# Patient Record
Sex: MALE | Race: BLACK OR AFRICAN AMERICAN | NOT HISPANIC OR LATINO | Employment: FULL TIME | URBAN - METROPOLITAN AREA
[De-identification: names, ages, dates, MRNs, and addresses within clinical notes are randomized per-mention and may not be internally consistent; named-entity substitution may affect disease eponyms.]

---

## 2017-05-28 ENCOUNTER — APPOINTMENT (OUTPATIENT)
Dept: GENERAL RADIOLOGY | Facility: CLINIC | Age: 52
End: 2017-05-28
Attending: EMERGENCY MEDICINE
Payer: COMMERCIAL

## 2017-05-28 ENCOUNTER — HOSPITAL ENCOUNTER (EMERGENCY)
Facility: CLINIC | Age: 52
Discharge: HOME | End: 2017-05-28
Attending: EMERGENCY MEDICINE | Admitting: EMERGENCY MEDICINE
Payer: COMMERCIAL

## 2017-05-28 VITALS
OXYGEN SATURATION: 100 % | HEIGHT: 71 IN | DIASTOLIC BLOOD PRESSURE: 81 MMHG | SYSTOLIC BLOOD PRESSURE: 123 MMHG | WEIGHT: 150 LBS | BODY MASS INDEX: 21 KG/M2 | TEMPERATURE: 98.3 F

## 2017-05-28 DIAGNOSIS — R55 SYNCOPE AND COLLAPSE: ICD-10-CM

## 2017-05-28 DIAGNOSIS — R07.9 CHEST PAIN, UNSPECIFIED TYPE: ICD-10-CM

## 2017-05-28 LAB
ANION GAP SERPL CALCULATED.3IONS-SCNC: 6 MMOL/L (ref 3–14)
BASOPHILS # BLD AUTO: 0 10E9/L (ref 0–0.2)
BASOPHILS NFR BLD AUTO: 0.7 %
BUN SERPL-MCNC: 28 MG/DL (ref 7–30)
CALCIUM SERPL-MCNC: 9.1 MG/DL (ref 8.5–10.1)
CHLORIDE SERPL-SCNC: 102 MMOL/L (ref 94–109)
CO2 SERPL-SCNC: 30 MMOL/L (ref 20–32)
CREAT SERPL-MCNC: 0.92 MG/DL (ref 0.66–1.25)
D DIMER PPP FEU-MCNC: NORMAL UG/ML FEU (ref 0–0.5)
DIFFERENTIAL METHOD BLD: NORMAL
EOSINOPHIL # BLD AUTO: 0.1 10E9/L (ref 0–0.7)
EOSINOPHIL NFR BLD AUTO: 1.2 %
ERYTHROCYTE [DISTWIDTH] IN BLOOD BY AUTOMATED COUNT: 11.5 % (ref 10–15)
GFR SERPL CREATININE-BSD FRML MDRD: 86 ML/MIN/1.7M2
GLUCOSE SERPL-MCNC: 105 MG/DL (ref 70–99)
HCT VFR BLD AUTO: 43.1 % (ref 40–53)
HGB BLD-MCNC: 14.6 G/DL (ref 13.3–17.7)
IMM GRANULOCYTES # BLD: 0 10E9/L (ref 0–0.4)
IMM GRANULOCYTES NFR BLD: 0.5 %
INTERPRETATION ECG - MUSE: NORMAL
LYMPHOCYTES # BLD AUTO: 1 10E9/L (ref 0.8–5.3)
LYMPHOCYTES NFR BLD AUTO: 22.6 %
MCH RBC QN AUTO: 30.2 PG (ref 26.5–33)
MCHC RBC AUTO-ENTMCNC: 33.9 G/DL (ref 31.5–36.5)
MCV RBC AUTO: 89 FL (ref 78–100)
MONOCYTES # BLD AUTO: 0.3 10E9/L (ref 0–1.3)
MONOCYTES NFR BLD AUTO: 7.6 %
NEUTROPHILS # BLD AUTO: 2.9 10E9/L (ref 1.6–8.3)
NEUTROPHILS NFR BLD AUTO: 67.4 %
NRBC # BLD AUTO: 0 10*3/UL
NRBC BLD AUTO-RTO: 0 /100
PLATELET # BLD AUTO: 167 10E9/L (ref 150–450)
POTASSIUM SERPL-SCNC: 4.2 MMOL/L (ref 3.4–5.3)
RBC # BLD AUTO: 4.83 10E12/L (ref 4.4–5.9)
SODIUM SERPL-SCNC: 138 MMOL/L (ref 133–144)
TROPONIN I BLD-MCNC: 0 UG/L (ref 0–0.1)
TROPONIN I BLD-MCNC: 0 UG/L (ref 0–0.1)
WBC # BLD AUTO: 4.3 10E9/L (ref 4–11)

## 2017-05-28 PROCEDURE — 85379 FIBRIN DEGRADATION QUANT: CPT | Performed by: EMERGENCY MEDICINE

## 2017-05-28 PROCEDURE — 84484 ASSAY OF TROPONIN QUANT: CPT | Mod: 91

## 2017-05-28 PROCEDURE — 71020 XR CHEST 2 VW: CPT

## 2017-05-28 PROCEDURE — 99285 EMERGENCY DEPT VISIT HI MDM: CPT | Mod: 25

## 2017-05-28 PROCEDURE — 93005 ELECTROCARDIOGRAM TRACING: CPT

## 2017-05-28 PROCEDURE — 85025 COMPLETE CBC W/AUTO DIFF WBC: CPT | Performed by: EMERGENCY MEDICINE

## 2017-05-28 PROCEDURE — 80048 BASIC METABOLIC PNL TOTAL CA: CPT | Performed by: EMERGENCY MEDICINE

## 2017-05-28 ASSESSMENT — ENCOUNTER SYMPTOMS
PALPITATIONS: 1
ABDOMINAL PAIN: 1
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
## 2017-05-28 NOTE — LETTER
[facility] EMERGENCY DEPARTMENT  [address]  [facility] [address]  [phone]    May 28, 2017    Lee Copeland  [address]   [facility] [address]  [phone] (home) none (work)    : 1965      To Whom it may concern:    Lee Copeland was seen in our Emergency Department today, May 28, 2017.  I expect his condition to improve over the next 3 days.  He may return to work when improved.    Sincerely,        Iain Kwon
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
## 2017-05-28 NOTE — ED AVS SNAPSHOT
[facility] Emergency Department    [address]    [facility] [address]    Phone:  [phone]    Fax:  [phone]                                       Lee Copeland   MRN: [id number]    Department:  [facility] Emergency Department   Date of Visit:  5/28/2017           Patient Information     Date Of Birth          1965        Your diagnoses for this visit were:     Syncope and collapse     Chest pain, unspecified type        You were seen by Iain Kwon MD.      Follow-up Information     Follow up with Clinic, [facility] In 2 days.    Contact information:    [address] [facility] [address]  [phone]          Follow up with [facility] Emergency Department.    Specialty:  EMERGENCY MEDICINE    Why:  If symptoms worsen    Contact information:    [address]  [facility] [address]  [phone]        Discharge Instructions         Fainting: Uncertain Cause  Fainting (syncope) is a temporary loss of consciousness. It s also called passing out. It occurs when blood flow to the brain is less than normal. Near-fainting (near-syncope) is very similar to fainting, but you don t fully pass out.  Common minor causes of fainting include:    Sudden fear    Pain    Nausea    Emotional stress    Overexertion  Suddenly standing up after sitting or lying for a long time can also cause fainting.  More serious causes of fainting include:    Very slow or very fast heartbeat (arrhythmia)    Other types of heart disease    Dehydration    Loss of blood loss    Seizure    Stroke    Ruptured blood vessel in the brain  Taking too much high blood pressure medicine can also cause low blood pressure and fainting.  Your health care provider does not know the exact cause of your fainting. But the tests today did not show any of the serious causes of fainting. Sometimes you may need more tests to find out if you have a serious  problem. That s why it s important to follow up with your provider as advised.  Home care  Follow these guidelines when caring for yourself at home:    Rest today. You may go back to your normal activities when you are feeling back to normal. It is best to stay with someone who can check on you for the next 24 hours to watch for another episode of fainting.    If you become lightheaded or dizzy, lie down right away. Or sit with your head between your knees.    Because the provider doesn t know the exact cause of your fainting or near-fainting spell, it s possible for you to have another spell without warning. Because of this, don t drive a car or operate dangerous equipment. Don t take a bath alone. Use a shower instead. Don t swim alone until your health care provider says that you are no longer in danger of having another fainting spell.  Follow-up care  Follow up with your health care provider, or as advised.  When to seek medical advice  Call your health care provider right away if any of these occur:    Another fainting spell that s not explained by the common causes listed above    Pain in your chest, arm, neck, jaw, back, or abdomen    Shortness of breath    Severe headache or seizure    Blood in vomit or stools (black or red color)    Unexpected vaginal bleeding    Your heart beats very rapidly, very slowly, or irregularly (palpitations)  Also call your provider if you have signs of stroke:    Weakness in an arm or leg or on one side of the face    Difficulty speaking or seeing    Extreme drowsiness, confusion, dizziness, or fainting    [id number] [employer]. [address] [place] [address], [place] [address]. All rights reserved. This information is not intended as a substitute for professional medical care. Always follow your healthcare professional's instructions.          24 Hour Appointment Hotline       To make an appointment at any [facility], call [phone] ([phone]). If you  don't have a family doctor or clinic, we will help you find one. [facility] are conveniently located to serve the needs of you and your family.             Review of your medicines      Notice     You have not been prescribed any medications.            Procedures and tests performed during your visit     Procedure/Test Number of Times Performed    Basic metabolic panel (BMP) 1    CBC + differential 1    D dimer quantitative 1    EKG 12 lead 1    ISTAT troponin 2    IV access 1    Troponin POCT 1    XR Chest 2 Views 1      Orders Needing Specimen Collection     None      Pending Results     Date and Time Order Name Status Description    5/28/2017 1136 EKG 12 lead Preliminary             Pending Culture Results     No orders found from 5/26/2017 to 5/29/2017.            Pending Results Instructions     If you had any lab results that were not finalized at the time of your Discharge, you can call the ED Lab Result RN at [phone]. You will be contacted by this team for any positive Lab results or changes in treatment. The nurses are available 7 days a week from 10A to 6:30P.  You can leave a message 24 hours per day and they will return your call.        Test Results From Your Hospital Stay        5/28/2017 12:18 PM      Component Results     Component Value Ref Range & Units Status    WBC 4.3 4.0 - 11.0 10e9/L Final    RBC Count 4.83 4.4 - 5.9 10e12/L Final    Hemoglobin 14.6 13.3 - 17.7 g/dL Final    Hematocrit 43.1 40.0 - 53.0 % Final    MCV 89 78 - 100 fl Final    MCH 30.2 26.5 - 33.0 pg Final    MCHC 33.9 31.5 - 36.5 g/dL Final    RDW 11.5 10.0 - 15.0 % Final    Platelet Count 167 150 - 450 10e9/L Final    Diff Method Automated Method  Final    % Neutrophils 67.4 % Final    % Lymphocytes 22.6 % Final    % Monocytes 7.6 % Final    % Eosinophils 1.2 % Final    % Basophils 0.7 % Final    % Immature Granulocytes 0.5 % Final    Nucleated RBCs 0 0 /100 Final    Absolute Neutrophil 2.9 1.6 - 8.3 10e9/L Final     Absolute Lymphocytes 1.0 0.8 - 5.3 10e9/L Final    Absolute Monocytes 0.3 0.0 - 1.3 10e9/L Final    Absolute Eosinophils 0.1 0.0 - 0.7 10e9/L Final    Absolute Basophils 0.0 0.0 - 0.2 10e9/L Final    Abs Immature Granulocytes 0.0 0 - 0.4 10e9/L Final    Absolute Nucleated RBC 0.0  Final         5/28/2017 12:31 PM      Component Results     Component Value Ref Range & Units Status    Sodium 138 133 - 144 mmol/L Final    Potassium 4.2 3.4 - 5.3 mmol/L Final    Chloride 102 94 - 109 mmol/L Final    Carbon Dioxide 30 20 - 32 mmol/L Final    Anion Gap 6 3 - 14 mmol/L Final    Glucose 105 (H) 70 - 99 mg/dL Final    Urea Nitrogen 28 7 - 30 mg/dL Final    Creatinine 0.92 0.66 - 1.25 mg/dL Final    GFR Estimate 86 >60 mL/min/1.7m2 Final    Non  GFR Calc    GFR Estimate If Black >90   GFR Calc   >60 mL/min/1.7m2 Final    Calcium 9.1 8.5 - 10.1 mg/dL Final         5/28/2017 12:30 PM      Component Results     Component Value Ref Range & Units Status    D Dimer  0.0 - 0.50 ug/ml FEU Final    <0.3  This D-dimer assay is intended for use in conjuntion with a clinical pretest   probability assessment model to exclude pulmonary embolism (PE) and as an aid   in the diagnosis of deep venous thrombosis (DVT) in outpatients suspected of PE   or DVT. The cut-off value is 0.5 g/mL FEU.           5/28/2017 12:13 PM      Narrative     CHEST TWO VIEWS   5/28/2017 12:11 PM    HISTORY:  Chest pain and shortness of breath.    COMPARISON:  None.    FINDINGS:  The heart size is normal. No mediastinal pathology is seen.  The lungs are clear. The pulmonary vasculature is normal. No  pneumothorax or pleural effusion is seen.         Impression     IMPRESSION:  Unremarkable chest.    YUDELKA BARRIENTOS MD         5/28/2017 12:16 PM      Component Results     Component Value Ref Range & Units Status    Troponin I 0.00 0.00 - 0.10 ug/L Final                Clinical Quality Measure: Blood Pressure Screening     Your  "blood pressure was checked while you were in the emergency department today. The last reading we obtained was  BP: 115/79 . Please read the guidelines below about what these numbers mean and what you should do about them.  If your systolic blood pressure (the top number) is less than 120 and your diastolic blood pressure (the bottom number) is less than 80, then your blood pressure is normal. There is nothing more that you need to do about it.  If your systolic blood pressure (the top number) is 120-139 or your diastolic blood pressure (the bottom number) is 80-89, your blood pressure may be higher than it should be. You should have your blood pressure rechecked within a year by a primary care provider.  If your systolic blood pressure (the top number) is 140 or greater or your diastolic blood pressure (the bottom number) is 90 or greater, you may have high blood pressure. High blood pressure is treatable, but if left untreated over time it can put you at risk for heart attack, stroke, or kidney failure. You should have your blood pressure rechecked by a primary care provider within the next 4 weeks.  If your provider in the emergency department today gave you specific instructions to follow-up with your doctor or provider even sooner than that, you should follow that instruction and not wait for up to 4 weeks for your follow-up visit.        Thank you for choosing [facility]       Thank you for choosing [facility] for your care. Our goal is always to provide you with excellent care. Hearing back from our patients is one way we can continue to improve our services. Please take a few minutes to complete the written survey that you may receive in the mail after you visit with us. Thank you!        [employer] Information     [employer] lets you send messages to your doctor, view your test results, renew your prescriptions, schedule appointments and more. To sign up, go to [email or web address] . Click on \"Log in\" on the left " "side of the screen, which will take you to the Welcome page. Then click on \"Sign up Now\" on the right side of the page.     You will be asked to enter the access code listed below, as well as some personal information. Please follow the directions to create your username and password.     Your access code is: [email or web address]  Expires: 2017  3:28 PM     Your access code will  in 90 days. If you need help or a new code, please call your [facility] clinic or [phone].        Care EveryWhere ID     This is your Care EveryWhere ID. This could be used by other organizations to access your [facility] medical records  [id number]        After Visit Summary       This is your record. Keep this with you and show to your community pharmacist(s) and doctor(s) at your next visit.                  "

## 2017-05-28 NOTE — ED AVS SNAPSHOT
[facility] Emergency Department    [address]    [facility] [address]    Phone:  [phone]    Fax:  [phone]                                       Lee Copeland   MRN: [id number]    Department:  [facility] Emergency Department   Date of Visit:  5/28/2017           After Visit Summary Signature Page     I have received my discharge instructions, and my questions have been answered. I have discussed any challenges I see with this plan with the nurse or doctor.    ..........................................................................................................................................  Patient/Patient Representative Signature      ..........................................................................................................................................  Patient Representative Print Name and Relationship to Patient    ..................................................               ................................................  Date                                            Time    ..........................................................................................................................................  Reviewed by Signature/Title    ...................................................              ..............................................  Date                                                            Time

## 2017-05-28 NOTE — DISCHARGE INSTRUCTIONS
Fainting: Uncertain Cause  Fainting (syncope) is a temporary loss of consciousness. It s also called passing out. It occurs when blood flow to the brain is less than normal. Near-fainting (near-syncope) is very similar to fainting, but you don t fully pass out.  Common minor causes of fainting include:    Sudden fear    Pain    Nausea    Emotional stress    Overexertion  Suddenly standing up after sitting or lying for a long time can also cause fainting.  More serious causes of fainting include:    Very slow or very fast heartbeat (arrhythmia)    Other types of heart disease    Dehydration    Loss of blood loss    Seizure    Stroke    Ruptured blood vessel in the brain  Taking too much high blood pressure medicine can also cause low blood pressure and fainting.  Your health care provider does not know the exact cause of your fainting. But the tests today did not show any of the serious causes of fainting. Sometimes you may need more tests to find out if you have a serious problem. That s why it s important to follow up with your provider as advised.  Home care  Follow these guidelines when caring for yourself at home:    Rest today. You may go back to your normal activities when you are feeling back to normal. It is best to stay with someone who can check on you for the next 24 hours to watch for another episode of fainting.    If you become lightheaded or dizzy, lie down right away. Or sit with your head between your knees.    Because the provider doesn t know the exact cause of your fainting or near-fainting spell, it s possible for you to have another spell without warning. Because of this, don t drive a car or operate dangerous equipment. Don t take a bath alone. Use a shower instead. Don t swim alone until your health care provider says that you are no longer in danger of having another fainting spell.  Follow-up care  Follow up with your health care provider, or as advised.  When to seek medical advice  Call  your health care provider right away if any of these occur:    Another fainting spell that s not explained by the common causes listed above    Pain in your chest, arm, neck, jaw, back, or abdomen    Shortness of breath    Severe headache or seizure    Blood in vomit or stools (black or red color)    Unexpected vaginal bleeding    Your heart beats very rapidly, very slowly, or irregularly (palpitations)  Also call your provider if you have signs of stroke:    Weakness in an arm or leg or on one side of the face    Difficulty speaking or seeing    Extreme drowsiness, confusion, dizziness, or fainting    [id number] [employer]. [address] [place] [address]. All rights reserved. This information is not intended as a substitute for professional medical care. Always follow your healthcare professional's instructions.

## 2022-03-03 ENCOUNTER — APPOINTMENT (OUTPATIENT)
Dept: GENERAL RADIOLOGY | Facility: CLINIC | Age: 57
End: 2022-03-03
Attending: EMERGENCY MEDICINE
Payer: COMMERCIAL

## 2022-03-03 ENCOUNTER — HOSPITAL ENCOUNTER (EMERGENCY)
Facility: CLINIC | Age: 57
Discharge: HOME | End: 2022-03-03
Attending: EMERGENCY MEDICINE | Admitting: EMERGENCY MEDICINE
Payer: COMMERCIAL

## 2022-03-03 VITALS
OXYGEN SATURATION: 100 % | HEART RATE: 62 BPM | TEMPERATURE: 97.2 F | SYSTOLIC BLOOD PRESSURE: 134 MMHG | RESPIRATION RATE: 11 BRPM | DIASTOLIC BLOOD PRESSURE: 80 MMHG

## 2022-03-03 DIAGNOSIS — R07.9 CHEST PAIN, UNSPECIFIED TYPE: ICD-10-CM

## 2022-03-03 LAB
ANION GAP SERPL CALCULATED.3IONS-SCNC: 2 MMOL/L (ref 3–14)
BASOPHILS # BLD AUTO: 0 10E3/UL (ref 0–0.2)
BASOPHILS NFR BLD AUTO: 1 %
BUN SERPL-MCNC: 12 MG/DL (ref 7–30)
CALCIUM SERPL-MCNC: 8.5 MG/DL (ref 8.5–10.1)
CHLORIDE BLD-SCNC: 105 MMOL/L (ref 94–109)
CO2 SERPL-SCNC: 32 MMOL/L (ref 20–32)
CREAT SERPL-MCNC: 0.84 MG/DL (ref 0.66–1.25)
EOSINOPHIL # BLD AUTO: 0.1 10E3/UL (ref 0–0.7)
EOSINOPHIL NFR BLD AUTO: 4 %
ERYTHROCYTE [DISTWIDTH] IN BLOOD BY AUTOMATED COUNT: 11.8 % (ref 10–15)
GFR SERPL CREATININE-BSD FRML MDRD: >90 ML/MIN/1.73M2
GLUCOSE BLD-MCNC: 87 MG/DL (ref 70–99)
HCT VFR BLD AUTO: 42 % (ref 40–53)
HGB BLD-MCNC: 14.2 G/DL (ref 13.3–17.7)
HOLD SPECIMEN: NORMAL
IMM GRANULOCYTES # BLD: 0 10E3/UL
IMM GRANULOCYTES NFR BLD: 0 %
LYMPHOCYTES # BLD AUTO: 1.4 10E3/UL (ref 0.8–5.3)
LYMPHOCYTES NFR BLD AUTO: 41 %
MCH RBC QN AUTO: 30.5 PG (ref 26.5–33)
MCHC RBC AUTO-ENTMCNC: 33.8 G/DL (ref 31.5–36.5)
MCV RBC AUTO: 90 FL (ref 78–100)
MONOCYTES # BLD AUTO: 0.4 10E3/UL (ref 0–1.3)
MONOCYTES NFR BLD AUTO: 12 %
NEUTROPHILS # BLD AUTO: 1.4 10E3/UL (ref 1.6–8.3)
NEUTROPHILS NFR BLD AUTO: 42 %
NRBC # BLD AUTO: 0 10E3/UL
NRBC BLD AUTO-RTO: 0 /100
PLATELET # BLD AUTO: 160 10E3/UL (ref 150–450)
POTASSIUM BLD-SCNC: 3.9 MMOL/L (ref 3.4–5.3)
RBC # BLD AUTO: 4.65 10E6/UL (ref 4.4–5.9)
SODIUM SERPL-SCNC: 139 MMOL/L (ref 133–144)
TROPONIN I SERPL HS-MCNC: 7 NG/L
TROPONIN I SERPL HS-MCNC: 9 NG/L
WBC # BLD AUTO: 3.3 10E3/UL (ref 4–11)

## 2022-03-03 PROCEDURE — 84484 ASSAY OF TROPONIN QUANT: CPT | Performed by: EMERGENCY MEDICINE

## 2022-03-03 PROCEDURE — 71046 X-RAY EXAM CHEST 2 VIEWS: CPT

## 2022-03-03 PROCEDURE — 82310 ASSAY OF CALCIUM: CPT | Performed by: EMERGENCY MEDICINE

## 2022-03-03 PROCEDURE — 99285 EMERGENCY DEPT VISIT HI MDM: CPT | Mod: 25

## 2022-03-03 PROCEDURE — 250N000013 HC RX MED GY IP 250 OP 250 PS 637: Performed by: EMERGENCY MEDICINE

## 2022-03-03 PROCEDURE — 36415 COLL VENOUS BLD VENIPUNCTURE: CPT | Performed by: EMERGENCY MEDICINE

## 2022-03-03 PROCEDURE — 93005 ELECTROCARDIOGRAM TRACING: CPT

## 2022-03-03 PROCEDURE — 85025 COMPLETE CBC W/AUTO DIFF WBC: CPT | Performed by: EMERGENCY MEDICINE

## 2022-03-03 RX ORDER — ASPIRIN 325 MG
325 TABLET ORAL ONCE
Status: COMPLETED | OUTPATIENT
Start: 2022-03-03 | End: 2022-03-03

## 2022-03-03 RX ADMIN — ASPIRIN 325 MG ORAL TABLET 325 MG: 325 PILL ORAL at 17:23

## 2022-03-03 ASSESSMENT — ENCOUNTER SYMPTOMS
NAUSEA: 0
FEVER: 0
VOMITING: 0
SHORTNESS OF BREATH: 0
COUGH: 0

## 2022-03-03 NOTE — ED PROVIDER NOTES
History   Chief Complaint:  Chest Pain      HPI   Lee Copeland is a 57 year old male with history of hyperlipidemia who presents for evaluation of chest pain. This morning around 0530 shortly after pushing a heavy couch the patient started to develop pain and pressure in his chest that has been present throughout the day, prompting him to come into the ED for evaluation. He cannot identify any exacerbating or alleviating factors for his pain, and it is not worse with deep breathing. He notes that he tends to get lots of exercise and does physical work. He denies any fever, cough, shortness of breath, leg swelling, nausea, or vomiting. He notes that he did have COVID-19 last year and he is vaccinated for COVID-19.     Review of Systems   Constitutional: Negative for fever.   Respiratory: Negative for cough and shortness of breath.    Cardiovascular: Positive for chest pain. Negative for leg swelling.   Gastrointestinal: Negative for nausea and vomiting.   All other systems reviewed and are negative.    Allergies:  No known drug allergies     Medications:  Omeprazole     Past Medical History:     GERD   Asthma  Hyperlipidemia     Family History:    Diabetes mellitus, type I - Daughter     Social History:  The patient presents to the ED accompanied by his daughter.   The patient is vaccinated for COVID-19.     Physical Exam     Patient Vitals for the past 24 hrs:   BP Temp Temp src Pulse Resp SpO2   03/03/22 1745 -- -- -- 58 20 100 %   03/03/22 1730 -- -- -- 61 10 100 %   03/03/22 1630 122/82 -- -- 62 19 100 %   03/03/22 1615 -- -- -- 62 14 --   03/03/22 1558 113/81 97.2  F (36.2  C) Temporal 67 18 100 %       Physical Exam  General: Patient is awake, alert and interactive when I enter the room  Head: The scalp, face, and head appear normal  Eyes: The pupils are equal, round, and reactive to light. Conjunctivae and sclerae are normal  Neck: Normal range of motion.  CV: Regular rate and rhythm. Peripheral pulses  including radial pulses are symmetric.   Resp: Lungs are clear without wheezes or rales. No respiratory distress.   GI: Abdomen is soft, no rigidity, guarding, or rebound. No distension. No tenderness to palpation in any quadrant.     MS: Chest wall is non tender to palpation. No asymmetric leg swelling, calf or thigh tenderness.    Skin: No rash or lesions noted. Normal capillary refill noted  Neuro: Speech is normal and fluent. Face is symmetric. Moving all extremities.   Psych:  Normal affect.  Appropriate interactions.     Emergency Department Course   ECG  ECG obtained at 16:06:50, ECG read at 1608  Normal sinus rhythm, Nonspecific T wave abnormality, Abnormal ECG   No significant change as compared to EKG dated 5/28/2017.   Rate 65 bpm. DC interval 170 ms. QRS duration 98 ms. QT/QTc 368/382 ms. P-R-T axes 71 12 73.     Imaging:  XR Chest 2 Views   Final Result   IMPRESSION: Negative chest.        Report per radiology    Laboratory:  Labs Ordered and Resulted from Time of ED Arrival to Time of ED Departure   BASIC METABOLIC PANEL - Abnormal       Result Value    Sodium 139      Potassium 3.9      Chloride 105      Carbon Dioxide (CO2) 32      Anion Gap 2 (*)     Urea Nitrogen 12      Creatinine 0.84      Calcium 8.5      Glucose 87      GFR Estimate >90     CBC WITH PLATELETS AND DIFFERENTIAL - Abnormal    WBC Count 3.3 (*)     RBC Count 4.65      Hemoglobin 14.2      Hematocrit 42.0      MCV 90      MCH 30.5      MCHC 33.8      RDW 11.8      Platelet Count 160      % Neutrophils 42      % Lymphocytes 41      % Monocytes 12      % Eosinophils 4      % Basophils 1      % Immature Granulocytes 0      NRBCs per 100 WBC 0      Absolute Neutrophils 1.4 (*)     Absolute Lymphocytes 1.4      Absolute Monocytes 0.4      Absolute Eosinophils 0.1      Absolute Basophils 0.0      Absolute Immature Granulocytes 0.0      Absolute NRBCs 0.0     TROPONIN I - Normal    Troponin I High Sensitivity 9     TROPONIN I - Normal     Troponin I High Sensitivity 7          Emergency Department Course:     Reviewed:  I reviewed nursing notes, vitals and past medical history    Assessments:  1605:  I obtained history and examined the patient as noted above.     1757: I updated and reassessed the patient.     Disposition:  The patient was discharged to home.     Impression & Plan     Medical Decision Making:  Patient is a 57-year-old gentleman with past medical history of hyperlipidemia who presents to the emergency department with left-sided chest pain that began around 530 this morning.  Patient reports that he was moving a heavy couch and began having pain shortly thereafter.  Pain has been constant since onset.  No clear exacerbating or alleviating symptoms.  Upon initial evaluation here he is hemodynamically stable with normal vital signs.  He is afebrile.  He is oxygenating well on room air.  He does not appear in acute distress.  Initial EKG does not show any acute signs of ischemia nor dysrhythmia.  Troponin x2 were within low level did not show significant rise.  I feel that ACS is much less likely given his work-up and presentation.  It be reasonable to have him follow-up with his primary care physician to discuss stress testing.  No additional sinister cause was elicited based on the patient's work-up.  Chest x-ray did not show any acute signs of pneumonia, pneumothorax, wide mediastinum, pleural effusion or pulmonary edema.  I feel that PE is less likely based on the patient's history and presentation.  Low risk by Wells criteria.  At this point I feel comfortable discharging the patient home and he can follow-up closely with his primary care physician.  If he has any new or worsening symptoms I instructed him to return the emergency department for further evaluation.    Diagnosis:    ICD-10-CM    1. Chest pain, unspecified type  R07.9        Scribe Disclosure:  PRITI, Francisco Wisdom, am serving as a scribe at 4:05 PM on 3/3/2022 to document  services personally performed by Scott Cullen MD, based on my observations and the provider's statements to me.         Scott Cullen MD  03/03/22 2090

## 2022-03-03 NOTE — ED TRIAGE NOTES
Pt arrives with c/o chest pressure that started after pushing a heavy couch this morning. Pt reports the pressure has lessened, but it is still there. Pt denies nausea and SOB. ABCs intact.

## 2022-03-04 LAB
ATRIAL RATE - MUSE: 65 BPM
DIASTOLIC BLOOD PRESSURE - MUSE: NORMAL MMHG
INTERPRETATION ECG - MUSE: NORMAL
P AXIS - MUSE: 71 DEGREES
PR INTERVAL - MUSE: 170 MS
QRS DURATION - MUSE: 98 MS
QT - MUSE: 368 MS
QTC - MUSE: 382 MS
R AXIS - MUSE: 12 DEGREES
SYSTOLIC BLOOD PRESSURE - MUSE: NORMAL MMHG
T AXIS - MUSE: 73 DEGREES
VENTRICULAR RATE- MUSE: 65 BPM

## 2022-08-10 ENCOUNTER — HOSPITAL ENCOUNTER (EMERGENCY)
Facility: CLINIC | Age: 57
Discharge: HOME | End: 2022-08-10
Attending: EMERGENCY MEDICINE | Admitting: EMERGENCY MEDICINE

## 2022-08-10 ENCOUNTER — APPOINTMENT (OUTPATIENT)
Dept: GENERAL RADIOLOGY | Facility: CLINIC | Age: 57
End: 2022-08-10
Attending: EMERGENCY MEDICINE

## 2022-08-10 ENCOUNTER — APPOINTMENT (OUTPATIENT)
Dept: CT IMAGING | Facility: CLINIC | Age: 57
End: 2022-08-10
Attending: EMERGENCY MEDICINE

## 2022-08-10 VITALS
DIASTOLIC BLOOD PRESSURE: 77 MMHG | OXYGEN SATURATION: 100 % | SYSTOLIC BLOOD PRESSURE: 114 MMHG | TEMPERATURE: 97.8 F | RESPIRATION RATE: 16 BRPM | HEART RATE: 62 BPM

## 2022-08-10 DIAGNOSIS — T14.8XXA NEURAPRAXIA: ICD-10-CM

## 2022-08-10 DIAGNOSIS — G44.319 ACUTE POST-TRAUMATIC HEADACHE, NOT INTRACTABLE: ICD-10-CM

## 2022-08-10 DIAGNOSIS — M54.50 ACUTE BILATERAL LOW BACK PAIN WITHOUT SCIATICA: ICD-10-CM
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
## 2022-08-10 DIAGNOSIS — M54.2 NECK PAIN: ICD-10-CM

## 2022-08-10 PROCEDURE — 72100 X-RAY EXAM L-S SPINE 2/3 VWS: CPT

## 2022-08-10 PROCEDURE — 99285 EMERGENCY DEPT VISIT HI MDM: CPT | Mod: 25

## 2022-08-10 PROCEDURE — 72040 X-RAY EXAM NECK SPINE 2-3 VW: CPT

## 2022-08-10 PROCEDURE — 70450 CT HEAD/BRAIN W/O DYE: CPT

## 2022-08-10 RX ORDER — CYCLOBENZAPRINE HCL 10 MG
10 TABLET ORAL 3 TIMES DAILY PRN
Qty: 15 TABLET | Refills: 0 | Status: SHIPPED | OUTPATIENT
Start: 2022-08-10

## 2022-08-10 RX ORDER — IBUPROFEN 600 MG/1
600 TABLET, FILM COATED ORAL EVERY 6 HOURS PRN
Qty: 30 TABLET | Refills: 0 | Status: SHIPPED | OUTPATIENT
Start: 2022-08-10

## 2022-08-10 ASSESSMENT — ACTIVITIES OF DAILY LIVING (ADL): ADLS_ACUITY_SCORE: 35

## 2022-08-10 ASSESSMENT — ENCOUNTER SYMPTOMS
SHORTNESS OF BREATH: 0
HEADACHES: 1
SLEEP DISTURBANCE: 1
NUMBNESS: 1
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
## 2023-04-16 ENCOUNTER — HEALTH MAINTENANCE LETTER (OUTPATIENT)
Age: 58
End: 2023-04-16

## 2023-12-05 ENCOUNTER — APPOINTMENT (OUTPATIENT)
Dept: MRI IMAGING | Facility: CLINIC | Age: 58
End: 2023-12-05
Attending: EMERGENCY MEDICINE
Payer: COMMERCIAL

## 2023-12-05 ENCOUNTER — HOSPITAL ENCOUNTER (EMERGENCY)
Facility: CLINIC | Age: 58
Discharge: HOME | End: 2023-12-05
Attending: EMERGENCY MEDICINE | Admitting: EMERGENCY MEDICINE
Payer: COMMERCIAL

## 2023-12-05 VITALS
HEART RATE: 66 BPM | RESPIRATION RATE: 22 BRPM | TEMPERATURE: 97.8 F | OXYGEN SATURATION: 100 % | DIASTOLIC BLOOD PRESSURE: 93 MMHG | SYSTOLIC BLOOD PRESSURE: 117 MMHG

## 2023-12-05 DIAGNOSIS — E23.6 EMPTY SELLA TURCICA (H): ICD-10-CM

## 2023-12-05 DIAGNOSIS — R42 DIZZINESS: ICD-10-CM

## 2023-12-05 DIAGNOSIS — H61.23 BILATERAL IMPACTED CERUMEN: ICD-10-CM

## 2023-12-05 LAB
ALBUMIN UR-MCNC: NEGATIVE MG/DL
ANION GAP SERPL CALCULATED.3IONS-SCNC: 8 MMOL/L (ref 7–15)
APPEARANCE UR: CLEAR
ATRIAL RATE - MUSE: 74 BPM
BASOPHILS # BLD AUTO: 0 10E3/UL (ref 0–0.2)
BASOPHILS NFR BLD AUTO: 1 %
BILIRUB UR QL STRIP: NEGATIVE
BUN SERPL-MCNC: 15.6 MG/DL (ref 8–23)
CALCIUM SERPL-MCNC: 8.7 MG/DL (ref 8.6–10)
CHLORIDE SERPL-SCNC: 106 MMOL/L (ref 98–107)
COLOR UR AUTO: YELLOW
CREAT SERPL-MCNC: 0.72 MG/DL (ref 0.67–1.17)
DEPRECATED HCO3 PLAS-SCNC: 26 MMOL/L (ref 22–29)
DIASTOLIC BLOOD PRESSURE - MUSE: NORMAL MMHG
EGFRCR SERPLBLD CKD-EPI 2021: >90 ML/MIN/1.73M2
EOSINOPHIL # BLD AUTO: 0.1 10E3/UL (ref 0–0.7)
EOSINOPHIL NFR BLD AUTO: 4 %
ERYTHROCYTE [DISTWIDTH] IN BLOOD BY AUTOMATED COUNT: 11.9 % (ref 10–15)
GLUCOSE SERPL-MCNC: 105 MG/DL (ref 70–99)
GLUCOSE UR STRIP-MCNC: NEGATIVE MG/DL
HCT VFR BLD AUTO: 42.7 % (ref 40–53)
HGB BLD-MCNC: 14.3 G/DL (ref 13.3–17.7)
HGB UR QL STRIP: NEGATIVE
IMM GRANULOCYTES # BLD: 0 10E3/UL
IMM GRANULOCYTES NFR BLD: 0 %
INTERPRETATION ECG - MUSE: NORMAL
KETONES UR STRIP-MCNC: NEGATIVE MG/DL
LEUKOCYTE ESTERASE UR QL STRIP: NEGATIVE
LYMPHOCYTES # BLD AUTO: 1.4 10E3/UL (ref 0.8–5.3)
LYMPHOCYTES NFR BLD AUTO: 42 %
MCH RBC QN AUTO: 30.2 PG (ref 26.5–33)
MCHC RBC AUTO-ENTMCNC: 33.5 G/DL (ref 31.5–36.5)
MCV RBC AUTO: 90 FL (ref 78–100)
MONOCYTES # BLD AUTO: 0.4 10E3/UL (ref 0–1.3)
MONOCYTES NFR BLD AUTO: 12 %
MUCOUS THREADS #/AREA URNS LPF: PRESENT /LPF
NEUTROPHILS # BLD AUTO: 1.3 10E3/UL (ref 1.6–8.3)
NEUTROPHILS NFR BLD AUTO: 41 %
NITRATE UR QL: NEGATIVE
NRBC # BLD AUTO: 0 10E3/UL
NRBC BLD AUTO-RTO: 0 /100
P AXIS - MUSE: 79 DEGREES
PH UR STRIP: 6.5 [PH] (ref 5–7)
PLATELET # BLD AUTO: 175 10E3/UL (ref 150–450)
POTASSIUM SERPL-SCNC: 4.3 MMOL/L (ref 3.4–5.3)
PR INTERVAL - MUSE: 166 MS
QRS DURATION - MUSE: 138 MS
QT - MUSE: 396 MS
QTC - MUSE: 439 MS
R AXIS - MUSE: -64 DEGREES
RBC # BLD AUTO: 4.73 10E6/UL (ref 4.4–5.9)
RBC URINE: 1 /HPF
SODIUM SERPL-SCNC: 140 MMOL/L (ref 135–145)
SP GR UR STRIP: 1.03 (ref 1–1.03)
SYSTOLIC BLOOD PRESSURE - MUSE: NORMAL MMHG
T AXIS - MUSE: 52 DEGREES
TROPONIN T SERPL HS-MCNC: <6 NG/L
UROBILINOGEN UR STRIP-MCNC: NORMAL MG/DL
VENTRICULAR RATE- MUSE: 74 BPM
WBC # BLD AUTO: 3.3 10E3/UL (ref 4–11)
WBC URINE: 1 /HPF

## 2023-12-05 PROCEDURE — A9585 GADOBUTROL INJECTION: HCPCS | Mod: JZ | Performed by: EMERGENCY MEDICINE

## 2023-12-05 PROCEDURE — 70544 MR ANGIOGRAPHY HEAD W/O DYE: CPT

## 2023-12-05 PROCEDURE — 84484 ASSAY OF TROPONIN QUANT: CPT | Performed by: STUDENT IN AN ORGANIZED HEALTH CARE EDUCATION/TRAINING PROGRAM

## 2023-12-05 PROCEDURE — 81001 URINALYSIS AUTO W/SCOPE: CPT | Performed by: EMERGENCY MEDICINE

## 2023-12-05 PROCEDURE — 85025 COMPLETE CBC W/AUTO DIFF WBC: CPT | Performed by: EMERGENCY MEDICINE

## 2023-12-05 PROCEDURE — 250N000013 HC RX MED GY IP 250 OP 250 PS 637: Performed by: STUDENT IN AN ORGANIZED HEALTH CARE EDUCATION/TRAINING PROGRAM

## 2023-12-05 PROCEDURE — 36415 COLL VENOUS BLD VENIPUNCTURE: CPT | Performed by: EMERGENCY MEDICINE

## 2023-12-05 PROCEDURE — 69209 REMOVE IMPACTED EAR WAX UNI: CPT | Mod: LT,RT

## 2023-12-05 PROCEDURE — 70553 MRI BRAIN STEM W/O & W/DYE: CPT

## 2023-12-05 PROCEDURE — 80048 BASIC METABOLIC PNL TOTAL CA: CPT | Performed by: EMERGENCY MEDICINE

## 2023-12-05 PROCEDURE — 70549 MR ANGIOGRAPH NECK W/O&W/DYE: CPT

## 2023-12-05 PROCEDURE — 93005 ELECTROCARDIOGRAM TRACING: CPT

## 2023-12-05 PROCEDURE — 99285 EMERGENCY DEPT VISIT HI MDM: CPT | Mod: 25

## 2023-12-05 PROCEDURE — 255N000002 HC RX 255 OP 636: Mod: JZ | Performed by: EMERGENCY MEDICINE

## 2023-12-05 RX ORDER — MECLIZINE HYDROCHLORIDE 25 MG/1
25 TABLET ORAL ONCE
Status: COMPLETED | OUTPATIENT
Start: 2023-12-05 | End: 2023-12-05

## 2023-12-05 RX ORDER — GADOBUTROL 604.72 MG/ML
10 INJECTION INTRAVENOUS ONCE
Status: COMPLETED | OUTPATIENT
Start: 2023-12-05 | End: 2023-12-05

## 2023-12-05 RX ORDER — MECLIZINE HYDROCHLORIDE 25 MG/1
25 TABLET ORAL 3 TIMES DAILY PRN
Qty: 25 TABLET | Refills: 0 | Status: SHIPPED | OUTPATIENT
Start: 2023-12-05

## 2023-12-05 RX ADMIN — MECLIZINE HYDROCHLORIDE 25 MG: 25 TABLET ORAL at 10:47

## 2023-12-05 RX ADMIN — GADOBUTROL 10 ML: 604.72 INJECTION INTRAVENOUS at 13:02

## 2023-12-05 ASSESSMENT — ACTIVITIES OF DAILY LIVING (ADL)
ADLS_ACUITY_SCORE: 35
ADLS_ACUITY_SCORE: 35

## 2023-12-05 NOTE — ED PROVIDER NOTES
ED ATTENDING PHYSICIAN NOTE:   I evaluated this patient in conjunction with May Cohen PA-C  I have participated in the care of the patient and personally performed key elements of the history, exam, and medical decision making.      HPI:   Lee Copeland is a 58 year old male who presents to the ED for evaluation of dizziness. The patient reports intermittent dizziness when he wakes in the morning.  Goes away after 10-15 minutes.  Been ongoing for a week about.        Independent Historian:   None - Patient Only    Review of External Notes:   Rviewed PCP note from 1/17/23 regarding ongoing issues and zyrtec.        EXAM:   General: Resting on the bed.  Head: No obvious trauma to head.  Ears, Nose, Throat:  External ears normal.  Nose normal.  No pharyngeal erythema, swelling or exudate.  Midline uvula.  Ear canal with wax obstructing   Eyes:  Conjunctivae clear.  Pupils are equal, round, and reactive.   Neck: Normal range of motion.  Neck supple.   CV: Regular rate and rhythm.  No murmurs.      Respiratory: Effort normal and breath sounds normal.  No wheezing or crackles.   Gastrointestinal: Soft.  No distension. There is no tenderness.     Neuro: Alert. Moving all extremities appropriately.  Normal speech.  CN II-XII grossly intact, no pronator drift, normal finger-nose-finger, visual fields intact.  Gross muscle strength intact of the proximal and distal bilateral upper and lower extremities.  Sensation intact to light touch in all 4 extremities.  2+ patellar reflexes.  Normal gait.  Negative rombergs  Skin: Skin is warm and dry.  No rash noted.     Independent Interpretation (X-rays, CTs, rhythm strip):  None    Consultations/Discussion of Management or Tests:  None     Social Determinants of Health affecting care:   None     MEDICAL DECISION MAKING/ASSESSMENT AND PLAN:   Lee Copeland is a 58 year old male who presents to the ER with dizziness.  Vital signs are reassuring.  Broad differential pursued  including but limited to stroke, tumor, dissection, mass, peripheral vertigo, otitis media, orthostasis, ACS, arrhythmia, anemia, etc.  EKG shows sinus rhythm, no acute ischemic changes.  Troponin is undetectable.  Low suspicion for ACS or arrhythmia.  CBC with chronic leukopenia but no anemia.  BMP without acute electrolyte, metabolic or renal dysfunction.  UA unrevealing for infection.  Given vague symptoms MRI, MRA were obtained.  Fortunately no signs of acute ischemia, hemorrhage etc.  There are some findings on MRI that were disclosed with patient the need outpatient neurology follow-up including the incidental infundibula as well as partially empty sella.  Patient's clinical history does not seem consistent with idiopathic intracranial hypertension.  Regardless outpatient neurology follow-up would be appropriate.  Patient is asymptomatic at this time after meclizine.  We have recommended outpatient neurology follow-up.  Patient feels well enough for discharge home.     DIAGNOSIS:     ICD-10-CM    1. Dizziness  R42       2. Bilateral impacted cerumen  H61.23       3. Empty sella turcica (H24)  E23.6     Partial           DISPOSITION:   discahrge     Scribe Disclosure:  I, Jeramie Layton, am serving as a scribe at 10:28 AM on 12/5/2023 to document services personally performed by Delilah Jauregui MD based on my observations and the provider's statements to me.     12/5/2023  [facility] EMERGENCY DEPT     Delilah Jauregui MD  12/05/23 4499

## 2023-12-05 NOTE — ED PROVIDER NOTES
History     Chief Complaint:  Dizziness     History limited by: patient is a poor historian.      Lee Copeland is a 58 year old male who presents to the ED for evaluation of dizziness. The patient reports he has felt dizzy in the morning for the last four days, and improves after roughly 15 minutes. Today he feels more dizzy than normal. He describes his dizziness as room-spinning present with his eyes close and worse with change of position. He states he has mild ear pressure but denies ear pain or muffled hearing. He notes one prior episode of dizziness several years ago, improved without intervention. The patient denies nausea, vomiting, gait changes, shortness of breath, chest pain, cough, congestion, fevers, chills, hearing changes, tinnitus, difficulty swallowing or talking, or medication changes.  Reports some mild neck pain although this is chronic.  He does endorse caffeine use but denies alcohol use, tobacco, or IV drug use.    Independent Historian:   None - Patient Only    Review of External Notes:   His annual physical exam from January of this year.  History of hyperlipidemia, prediabetes, GERD, allergic rhinitis    Medications:  Omeprazole     Past Medical History:     GERD  Asthma   Pre-diabetes  Hyperlipidemia    Leukopenia     Past Surgical History:    No past surgical history    Physical Exam   Patient Vitals for the past 24 hrs:   BP Temp Pulse Resp SpO2   12/05/23 1200 -- -- 67 22 --   12/05/23 1044 121/73 -- 64 21 100 %   12/05/23 1030 (!) 132/92 -- 66 16 100 %   12/05/23 1015 133/84 -- -- 20 100 %   12/05/23 0744 128/81 97.8  F (36.6  C) 77 18 100 %      Physical Exam  Vital signs and nursing notes reviewed.    General:  Alert and oriented, no acute distress. Resting on bed.   Skin: Skin is warm and dry. No rashes, lesions, or erythema. No diaphoresis.  HEENT:   Head: Normocephalic, atraumatic. Facial features symmetric.   Eyes: Conjunctiva pink, sclera white. EOMs grossly intact.   Ears:  Auricles without lesion, erythema, or edema.  Copious amounts of cerumen in bilateral ear canals.  After irrigation, TMs are pearly grey without erythema or bulging or perforation.  Nose: Symmetric with no discharge.  Mouth and throat: Lips are moist with no lesions or edema, Buccal and oropharyngeal mucosa is pink and moist without lesions or exudate. Uvula is midline.  Neck: Normal range of motion.  No midline C-spine tenderness.    CV:  Heart RRR with no murmurs or extra heart sounds. 2+ radial and tibialis posterior pulses bilaterally. No peripheral edema.  Pulm/Chest: Chest wall expansion symmetric with no increased effort of breathing. Lungs clear and equal to auscultation bilaterally.   Abd: Abdomen is soft and nontender to palpation in all 4 quadrants with no guarding or rebound.   M/S: Moves all extremities spontaneously.  Psych: Normal mood and affect. Behavior is normal.   Neuro: Alert. Normal strength. Sensation intact in all 4 extremities. Cranial nerves II-XII intact. No pronator drift, normal finger-nose-finger, visual fields intact by confrontation. No nystagmus.     Emergency Department Course   ECG  ECG taken at 0802, ECG read at 0802  No STEMI  Normal sinus rhythm  Left axis deviation  Right bundle branch block, new since 3/3/22  Abnormal ECG   Rate 74 bpm. NJ interval 166 ms. QRS duration 138 ms. QT/QTc 396/439 ms. P-R-T axes 79/-64/52.     Imaging:  MR Brain w/o & w Contrast   Final Result   IMPRESSION:   1. No evidence of acute ischemia or hemorrhage.   2. Partially empty sella turcica and prominence of the bilateral optic   nerve sheaths which is nonspecific and potentially incidental but can   be seen in the setting of idiopathic intracranial hypertension.   Further workup could be pursued.      JOEL THAO MD            SYSTEM ID:  [id number]      MRA Angiogram Head w/o Contrast   Final Result   IMPRESSION:   1. No evidence of large vessel occlusion or high-grade stenosis.   2. Tiny  outpouchings off the bilateral internal carotid arteries at   the bilateral posterior communicating and bilateral anterior choroidal   artery origins, nonspecific, presumably incidental infundibula.      JOEL THAO MD            SYSTEM ID:  [id number]      MRA Angiogram Neck w/o & w Contrast   Final Result   IMPRESSION: Unremarkable MRA of the neck.      JOEL THAO MD            SYSTEM ID:  [id number]         Laboratory:  Labs Ordered and Resulted from Time of ED Arrival to Time of ED Departure   BASIC METABOLIC PANEL - Abnormal       Result Value    Sodium 140      Potassium 4.3      Chloride 106      Carbon Dioxide (CO2) 26      Anion Gap 8      Urea Nitrogen 15.6      Creatinine 0.72      GFR Estimate >90      Calcium 8.7      Glucose 105 (*)    CBC WITH PLATELETS AND DIFFERENTIAL - Abnormal    WBC Count 3.3 (*)     RBC Count 4.73      Hemoglobin 14.3      Hematocrit 42.7      MCV 90      MCH 30.2      MCHC 33.5      RDW 11.9      Platelet Count 175      % Neutrophils 41      % Lymphocytes 42      % Monocytes 12      % Eosinophils 4      % Basophils 1      % Immature Granulocytes 0      NRBCs per 100 WBC 0      Absolute Neutrophils 1.3 (*)     Absolute Lymphocytes 1.4      Absolute Monocytes 0.4      Absolute Eosinophils 0.1      Absolute Basophils 0.0      Absolute Immature Granulocytes 0.0      Absolute NRBCs 0.0     ROUTINE UA WITH MICROSCOPIC REFLEX TO CULTURE - Abnormal    Color Urine Yellow      Appearance Urine Clear      Glucose Urine Negative      Bilirubin Urine Negative      Ketones Urine Negative      Specific Gravity Urine 1.027      Blood Urine Negative      pH Urine 6.5      Protein Albumin Urine Negative      Urobilinogen Urine Normal      Nitrite Urine Negative      Leukocyte Esterase Urine Negative      Mucus Urine Present (*)     RBC Urine 1      WBC Urine 1     TROPONIN T, HIGH SENSITIVITY - Normal    Troponin T, High Sensitivity <6        Emergency Department Course & Assessments:    Interventions:  Medications   meclizine (ANTIVERT) tablet 25 mg (25 mg Oral $Given 12/5/23 1047)   gadobutrol (GADAVIST) injection 10 mL (10 mLs Intravenous $Given 12/5/23 1302)   sodium chloride (PF) 0.9% PF flush 60 mL (100 mLs Intravenous $Given 12/5/23 1302)      Independent Interpretation (X-rays, CTs, rhythm strip):  None    Consultations/Discussion of Management or Tests:  None     Assessments:  1022 Initial Examination  ED Course as of 12/05/23 1457   Tue Dec 05, 2023   1005 I initially assessed the patient and obtained the above history and physical exam.     1105 Dr. Jauregui assessed the patient   1152 I reassessed the patient and rechecked ears after irrigation. Patient feeling improved somewhat after irrigation and Meclizine.  Declines offer of Tylenol for neck pain.   1435 I reassessed the patient and updated him on MRI results.  We discussed plan of care.  He is agreeable to discharge home.     Social Determinants of Health affecting care:   None    Disposition:  The patient was discharged to home.     Impression & Plan    CMS Diagnoses: None    Medical Decision Making:  Lee Copeland is a 58 year old male who presents to the emergency department with approximately 4 days of dizziness, particularly in the mornings.  It is sometimes room spinning in nature and sometimes not.  He endorses some mild neck pain in addition to this, however this is chronic.  See HPI.  Vital signs are normal.  On exam, patient is well-appearing and nontoxic.  He is neurologically intact.  He has no obvious nystagmus.  He does have copious amount of cerumen impacted in bilateral ear canals.  This was irrigated out and the patient felt much improved.  He also felt better after a dose of p.o. meclizine.  However given his neck pain and intermittent room spinning dizziness, MRI was obtained to rule out stroke. MRA of the head and neck revealed no evidence of large vessel occlusion or high-grade stenosis.  There is tiny  outpouchings of the bilateral internal carotid arteries, which is nonspecific and incidental. MRI brain reveals no evidence of acute ischemia or hemorrhage.  There is partially empty sella turcica and prominence of the bilateral optic nerve sheaths, which is also nonspecific and likely incidental but could be present in idiopathic intracranial hypertension.  The patient has no headache or eye symptoms to indicate IIH.  Laboratory workup is reassuring including CBC without leukocytosis or anemia, BMP without electrolyte, metabolic. or renal abnormalities, and UA without infection.  UA was obtained at patient request because the patient has history of UTIs but he is asymptomatic at this time.  EKG was obtained and reveals a possible new right bundle branch block with no other evidence of ischemia or sinister arrhythmia.  He has no chest pain or shortness of breath.  Troponin is undetectable at less than 6, ruling out ACS at this time.  Pulmonary embolism was considered, however the patient is not hypoxic, does not have pleuritic chest pain, is not short of breath, is not tachycardic, and has no recent provoking factors making this extremely unlikely.  He feels significantly improved after Antivert and cerumen disimpaction.  He is able to ambulate without difficulty.  He has no diplopia, dysarthria, dysphagia, or other red flag symptoms.  Using reasonable clinical judgment, I do feel he is safe for discharge home at this time.  He is instructed to follow-up with primary care and/or neurology as needed for ongoing management and evaluation of his symptoms.  He is also instructed to return to the ED should he develop fevers, headache, chest pain, fainting, or any further concerns.  He was agreeable to this plan and had questions answered.    I staffed this patient with Dr. Jauregui who agrees with the above assessment and plan.    Diagnosis:    ICD-10-CM    1. Dizziness  R42       2. Bilateral impacted cerumen  H61.23        3. Empty sella turcica (H24)  E23.6     Partial           Discharge Medications:  New Prescriptions    No medications on file      Scribe Disclosure:  I, Jeramie Layton, am serving as a scribe at 10:26 AM on 12/5/2023 to document services personally performed by May Cohen PA based on my observations and the provider's statements to me.     12/5/2023   CARLOS Parks. MERRITT Cohen on 12/5/2023 at 3:20 PM         May Cohen PA-C  12/05/23 1520

## 2023-12-05 NOTE — DISCHARGE INSTRUCTIONS
Take Meclizine as needed for dizziness  Stay well hydrated  Follow-up with your primary care provider and neurology if needed for further evaluation and management of your symptoms.  Return to the emergency department should you develop chest pain, shortness of breath, weakness or numbness, headache, or any further concerns.  Discharge Instructions  Vertigo  You have been diagnosed with vertigo.  This is a dizzy feeling often described as spinning or that the room is moving around you. You will often have nausea (sick to your stomach), vomiting (throwing up), and balance problems with it.  Vertigo is usually caused by a problem in the inner ear which helps control your balance.  Many things can cause vertigo, including calcium collections in the inner ear, a virus infection of the inner ear, concussion, migraine, and some medicines.  Luckily, these causes are not life threatening and will eventually go away.  However, sometimes there is a serious problem that does not show up right away.  Generally, every Emergency Department visit should have a follow-up clinic visit with either a primary or a specialty clinic/provider. Please follow-up as instructed by your emergency provider today.  Return to the Emergency Department if you have:  New or severe headache.  Double vision (seeing two of things).  Trouble speaking or hearing.  Weakness or trouble moving/using one side of your body.  Passing out.  Numbness or tingling.  Chest pain.  Vomiting that will not stop.    Treatment:  There are several commonly prescribed medications:  Antihistamines such as meclizine (Antivert ), dimenhydrinate (Dramamine ), or diphenhydramine (Benadryl ).  Prescription anti-nausea medicines, such as promethazine (Phenergan ), metoclopramide (Reglan ), or ondansetron (Zofran ).  Prescription sedative medicines, such as diazepam (Valium ), lorazepam (Ativan ), or clonazepam (Klonopin ).  Most of these medicines make you sleepy, and you should  not take them before you work or drive. You should only take prescription medicines to treat severe vertigo symptoms, and you should stop the medicine when your symptoms improve.    Follow Up:  If you have vertigo longer than three days, it is important that you follow up either with your primary provider or an Ear, Nose, and Throat (ENT) specialist.  You may need further testing to evaluate your vertigo and you may also need  vestibular  therapy which is a special form of physical therapy to make the vertigo go away.    If you were given a prescription for medicine here today, be sure to read all of the information (including the package insert) that comes with your prescription.  This will include important information about the medicine, its side effects, and any warnings that you need to know about.  The pharmacist who fills the prescription can provide more information and answer questions you may have about the medicine.  If you have questions or concerns that the pharmacist cannot address, please call or return to the Emergency Department.     Remember that you can always come back to the Emergency Department if you are not able to see your regular provider in the amount of time listed above, if you get any new symptoms, or if there is anything that worries you.

## 2023-12-05 NOTE — ED TRIAGE NOTES
Patient presents with complaints of dizziness in the morning for a week which was worse this morning. ABC intact without need for intervention at this time.

## 2024-04-14 ENCOUNTER — HEALTH MAINTENANCE LETTER (OUTPATIENT)
Age: 59
End: 2024-04-14

## 2024-07-31 ENCOUNTER — HOSPITAL ENCOUNTER (EMERGENCY)
Facility: CLINIC | Age: 59
Discharge: HOME | End: 2024-08-01
Attending: EMERGENCY MEDICINE | Admitting: EMERGENCY MEDICINE

## 2024-07-31 ENCOUNTER — APPOINTMENT (OUTPATIENT)
Dept: GENERAL RADIOLOGY | Facility: CLINIC | Age: 59
End: 2024-07-31
Attending: EMERGENCY MEDICINE

## 2024-07-31 DIAGNOSIS — R05.1 ACUTE COUGH: ICD-10-CM

## 2024-07-31 LAB
ANION GAP SERPL CALCULATED.3IONS-SCNC: 13 MMOL/L (ref 7–15)
BASOPHILS # BLD AUTO: 0 10E3/UL (ref 0–0.2)
BASOPHILS NFR BLD AUTO: 1 %
BUN SERPL-MCNC: 14.4 MG/DL (ref 8–23)
CALCIUM SERPL-MCNC: 9 MG/DL (ref 8.8–10.4)
CHLORIDE SERPL-SCNC: 105 MMOL/L (ref 98–107)
CREAT SERPL-MCNC: 0.8 MG/DL (ref 0.67–1.17)
D DIMER PPP FEU-MCNC: 0.48 UG/ML FEU (ref 0–0.5)
EGFRCR SERPLBLD CKD-EPI 2021: >90 ML/MIN/1.73M2
EOSINOPHIL # BLD AUTO: 0.1 10E3/UL (ref 0–0.7)
EOSINOPHIL NFR BLD AUTO: 2 %
ERYTHROCYTE [DISTWIDTH] IN BLOOD BY AUTOMATED COUNT: 12.2 % (ref 10–15)
GLUCOSE SERPL-MCNC: 93 MG/DL (ref 70–99)
HCO3 SERPL-SCNC: 23 MMOL/L (ref 22–29)
HCT VFR BLD AUTO: 40.5 % (ref 40–53)
HGB BLD-MCNC: 13.6 G/DL (ref 13.3–17.7)
IMM GRANULOCYTES # BLD: 0 10E3/UL
IMM GRANULOCYTES NFR BLD: 1 %
LYMPHOCYTES # BLD AUTO: 1.8 10E3/UL (ref 0.8–5.3)
LYMPHOCYTES NFR BLD AUTO: 50 %
MCH RBC QN AUTO: 29.2 PG (ref 26.5–33)
MCHC RBC AUTO-ENTMCNC: 33.6 G/DL (ref 31.5–36.5)
MCV RBC AUTO: 87 FL (ref 78–100)
MONOCYTES # BLD AUTO: 0.5 10E3/UL (ref 0–1.3)
MONOCYTES NFR BLD AUTO: 12 %
NEUTROPHILS # BLD AUTO: 1.3 10E3/UL (ref 1.6–8.3)
NEUTROPHILS NFR BLD AUTO: 34 %
NRBC # BLD AUTO: 0 10E3/UL
NRBC BLD AUTO-RTO: 0 /100
PLATELET # BLD AUTO: 241 10E3/UL (ref 150–450)
POTASSIUM SERPL-SCNC: 4.2 MMOL/L (ref 3.4–5.3)
RBC # BLD AUTO: 4.65 10E6/UL (ref 4.4–5.9)
SODIUM SERPL-SCNC: 141 MMOL/L (ref 135–145)
WBC # BLD AUTO: 3.6 10E3/UL (ref 4–11)

## 2024-07-31 PROCEDURE — 71046 X-RAY EXAM CHEST 2 VIEWS: CPT

## 2024-07-31 PROCEDURE — 93005 ELECTROCARDIOGRAM TRACING: CPT

## 2024-07-31 PROCEDURE — 99285 EMERGENCY DEPT VISIT HI MDM: CPT

## 2024-07-31 PROCEDURE — 80048 BASIC METABOLIC PNL TOTAL CA: CPT | Performed by: EMERGENCY MEDICINE

## 2024-07-31 PROCEDURE — 85379 FIBRIN DEGRADATION QUANT: CPT | Performed by: EMERGENCY MEDICINE

## 2024-07-31 PROCEDURE — 36415 COLL VENOUS BLD VENIPUNCTURE: CPT | Performed by: EMERGENCY MEDICINE

## 2024-07-31 PROCEDURE — 85025 COMPLETE CBC W/AUTO DIFF WBC: CPT | Performed by: EMERGENCY MEDICINE

## 2024-07-31 RX ORDER — ALBUTEROL SULFATE 0.83 MG/ML
2.5 SOLUTION RESPIRATORY (INHALATION)
Status: DISCONTINUED | OUTPATIENT
Start: 2024-07-31 | End: 2024-08-01 | Stop reason: HOSPADM

## 2024-07-31 ASSESSMENT — ACTIVITIES OF DAILY LIVING (ADL)
ADLS_ACUITY_SCORE: 35

## 2024-07-31 ASSESSMENT — COLUMBIA-SUICIDE SEVERITY RATING SCALE - C-SSRS
6. HAVE YOU EVER DONE ANYTHING, STARTED TO DO ANYTHING, OR PREPARED TO DO ANYTHING TO END YOUR LIFE?: NO
1. IN THE PAST MONTH, HAVE YOU WISHED YOU WERE DEAD OR WISHED YOU COULD GO TO SLEEP AND NOT WAKE UP?: NO
2. HAVE YOU ACTUALLY HAD ANY THOUGHTS OF KILLING YOURSELF IN THE PAST MONTH?: NO

## 2024-08-01 VITALS
WEIGHT: 159.83 LBS | HEIGHT: 71 IN | SYSTOLIC BLOOD PRESSURE: 121 MMHG | HEART RATE: 59 BPM | OXYGEN SATURATION: 99 % | BODY MASS INDEX: 22.38 KG/M2 | TEMPERATURE: 97.4 F | DIASTOLIC BLOOD PRESSURE: 83 MMHG | RESPIRATION RATE: 18 BRPM

## 2024-08-01 LAB
ATRIAL RATE - MUSE: 60 BPM
DIASTOLIC BLOOD PRESSURE - MUSE: NORMAL MMHG
INTERPRETATION ECG - MUSE: NORMAL
P AXIS - MUSE: 74 DEGREES
PR INTERVAL - MUSE: 170 MS
QRS DURATION - MUSE: 142 MS
QT - MUSE: 414 MS
QTC - MUSE: 414 MS
R AXIS - MUSE: -23 DEGREES
SYSTOLIC BLOOD PRESSURE - MUSE: NORMAL MMHG
T AXIS - MUSE: 42 DEGREES
VENTRICULAR RATE- MUSE: 60 BPM

## 2024-08-01 RX ORDER — ALBUTEROL SULFATE 90 UG/1
2 AEROSOL, METERED RESPIRATORY (INHALATION) EVERY 6 HOURS PRN
Qty: 18 G | Refills: 0 | Status: SHIPPED | OUTPATIENT
Start: 2024-08-01

## 2024-08-01 NOTE — ED TRIAGE NOTES
Here for dry cough for a couple months, was [place] and came back on July 19th from [place]. Denies productive cough, night sweats of fever. States pain in lungs when coughing. States air conditioning makes cough worse.

## 2024-08-01 NOTE — ED PROVIDER NOTES
"  Emergency Department Note      History of Present Illness   Chief Complaint   Cough    HPI   Lee Copeland is a 59 year old male who presents with his daughter for an evaluation of a cough. The patient stated since he went to [facility] for a pilgrimage, he has had a cough. He added seeing a doctor there on July 30th and being given prednisone and azithromycin. He also added the cough got better until a few weeks ago when it worsened again. He stated having tightness in his chest and shortness of breath. He added his cough was producing mucus in the beginning and is now dry. He denies chest pain, fever, sweats, weight lose and nasal drainage. He also denies tobacco use or currently using an inhaler. He noted having a history of mild asthma. He noted having x-rays in June and they came back normal.     Independent Historian   None    Review of External Notes   I reviewed an urgent care note from today.   Past Medical History   Medical History and Problem List   GERD  Hyperlipidemia   Asthma  Myalgia     Medications   Omeprazole   Physical Exam   Patient Vitals for the past 24 hrs:   BP Temp Pulse Resp SpO2 Height Weight   08/01/24 0017 121/83 -- -- -- -- -- --   07/31/24 2302 121/83 -- 59 -- 99 % -- --   07/31/24 2247 124/84 -- 61 -- 99 % -- --   07/31/24 2232 119/84 -- 64 -- 100 % -- --   07/31/24 2217 122/81 -- 62 -- 99 % -- --   07/31/24 2202 121/78 -- 63 -- 99 % -- --   07/31/24 2147 117/86 -- 62 -- 100 % -- --   07/31/24 2132 116/79 -- 72 -- 100 % -- --   07/31/24 2041 130/88 -- 65 18 100 % -- --   07/31/24 2016 122/78 97.4  F (36.3  C) 70 18 100 % -- --   07/31/24 2013 -- -- -- -- -- -- 72.5 kg (159 lb 13.3 oz)   07/31/24 2012 -- -- -- -- -- 1.803 m (5' 11\") --     Physical Exam  General: Resting on the bed.  Head: No obvious trauma to head.  Ears, Nose, Throat:  External ears normal.  Nose normal.    Eyes:  Conjunctivae clear.  Pupils are equal, round, and reactive.   Neck: Normal range of motion.  Neck " supple.   CV: Regular rate and rhythm.  No murmurs.      Respiratory: Effort normal and breath sounds normal.  No wheezing or crackles.   Gastrointestinal: Soft.  No distension. There is no tenderness.  There is no rigidity, no rebound and no guarding.   Musculoskeletal:Non tender non edematous calves  Neuro: Alert. Moving all extremities appropriately.  Normal speech.    Skin: Skin is warm and dry.  No rash noted.     Diagnostics   Lab Results   Labs Ordered and Resulted from Time of ED Arrival to Time of ED Departure   CBC WITH PLATELETS AND DIFFERENTIAL - Abnormal       Result Value    WBC Count 3.6 (*)     RBC Count 4.65      Hemoglobin 13.6      Hematocrit 40.5      MCV 87      MCH 29.2      MCHC 33.6      RDW 12.2      Platelet Count 241      % Neutrophils 34      % Lymphocytes 50      % Monocytes 12      % Eosinophils 2      % Basophils 1      % Immature Granulocytes 1      NRBCs per 100 WBC 0      Absolute Neutrophils 1.3 (*)     Absolute Lymphocytes 1.8      Absolute Monocytes 0.5      Absolute Eosinophils 0.1      Absolute Basophils 0.0      Absolute Immature Granulocytes 0.0      Absolute NRBCs 0.0     BASIC METABOLIC PANEL - Normal    Sodium 141      Potassium 4.2      Chloride 105      Carbon Dioxide (CO2) 23      Anion Gap 13      Urea Nitrogen 14.4      Creatinine 0.80      GFR Estimate >90      Calcium 9.0      Glucose 93     D DIMER QUANTITATIVE - Normal    D-Dimer Quantitative 0.48         Imaging   XR Chest 2 Views   Final Result   IMPRESSION: Negative chest.        EKG     ECG results from 07/31/24   EKG 12-lead, tracing only     Value    Systolic Blood Pressure     Diastolic Blood Pressure     Ventricular Rate 60    Atrial Rate 60    DE Interval 170    QRS Duration 142        QTc 414    P Axis 74    R AXIS -23    T Axis 42    Interpretation ECG      Sinus rhythm  Right bundle branch block  Abnormal ECG  No significant changes from 12/5/23         Independent Interpretation   CXR: No  pneumothorax, infiltrate, pleural effusion, or pulmonary edema.  ED Course    Medications Administered   Medications   albuterol (PROVENTIL) neb solution 2.5 mg (has no administration in time range)     Procedures   Procedures     Discussion of Management   None    ED Course   ED Course as of 08/01/24 0029   Wed Jul 31, 2024 2117 I obtained history and examined the patient as noted above.    Thu Aug 01, 2024   0000 I reassessed the patient and discussed results      Additional Documentation  None  Medical Decision Making / Diagnosis   CMS Diagnoses: None    MIPS       None    MDM   Lee Copeland is a 59 year old male who presents to the emergency department with cough.  Vital signs are reassuring.  Broad differentials considered include not limited to pneumonia, pneumothorax, effusion, ACS, arrhythmia, PE, anemia, etc.  CBC shows no leukocytosis, patient has chronic leukopenia but no anemia.  BMP without acute electrolyte, metabolic or renal abnl.  Considered PE but dimer is negative.  PE is unlikely with negative D-dimer.  Chest x-ray without acute pneumonia, pneumothorax or effusion.  EKG showed sinus rhythm, no acute ischemic change.  Patient has no chest pain.  I do not suspect ACS or arrhythmia.  There is no obvious wheezing or crackles on examination to indicate asthma exacerbation.  Patient's not hypoxic, no increased work of breathing, no indication for hospitalization.  Reports a history of asthma therefore I will prescribe an inhaler to see if this helps with cough.  Recommend follow-up with his primary doctor and/or pulmonology.  Return precautions were discussed.  Patient discharged home.    Disposition   The patient was discharged.     Diagnosis     ICD-10-CM    1. Acute cough  R05.1            Discharge Medications   Discharge Medication List as of 8/1/2024 12:13 AM        START taking these medications    Details   albuterol (PROAIR HFA/PROVENTIL HFA/VENTOLIN HFA) 108 (90 Base) MCG/ACT inhaler  Inhale 2 puffs into the lungs every 6 hours as needed for shortness of breath, wheezing or cough, Disp-18 g, R-0, E-PrescribePharmacy may dispense brand covered by insurance (Proair, or proventil or ventolin or generic albuterol inhaler)           Scribe Disclosure:  I, Margarette Orr, am serving as a scribe at 8:43 PM on 7/31/2024 to document services personally performed by Delilah Jauregui MD based on my observations and the provider's statements to me.      Delilah Jauregui MD  08/01/24 0031

## 2024-08-01 NOTE — DISCHARGE INSTRUCTIONS
Please return to the ED if you have worsening cough, fevers >101, chest pain, shortness of breath, intractable vomiting, or other acute changes.  Please follow up with your PCP in the next 2-3 days.      Use tylenol and ibuprofen for pain.  Drink plenty of fluids and rest.      May try albuterol as needed for cough    Discharge Instructions  Upper Respiratory Infection    The upper respiratory tract includes the sinuses, nasal passages, pharynx, and larynx. A URI, or upper respiratory infection, is an infection of any of the parts of the upper airway. Symptoms include runny nose, congestion, sneezing, sore throat, cough, and fever. URIs are almost always caused by a virus. Antibiotics do not help with viral infections, so are generally not prescribed. A URI is very contagious through coughing and nasal secretions; make sure you wash your hands often and clean surfaces after sneezing, coughing or touching them. While you should start to improve in 3 - 5 days, remember that sometimes a cough can linger for several weeks.    Generally, every Emergency Department visit should have a follow-up clinic visit with either a primary or a specialty clinic/provider. Please follow-up as instructed by your emergency provider today.    Return to the Emergency Department if:  Any of your symptoms get much worse.  You seem very sick, like being too weak to get up.  You have chest pain or shortness of breath.   You have a severe headache.  You are vomiting (throwing up) so much you cannot keep fluids or medicines down.  You have confusion or seem unusually drowsy.  You have a seizure.    What can I do to help myself?  Fill any prescriptions the provider gave you and take them right away  If you have a fever, get plenty of rest and drink lots of fluids, especially water.  Using a humidifier or saline nose spray will also help loosen mucous.   Clothes or blankets will not change your fever. Do what is comfortable for you.  Bathing or  sponging in lukewarm water may help you feel better.  Acetaminophen (Tylenol ) or ibuprofen (Advil , Motrin ) will help bring fever down and may help you feel more comfortable. Be sure to read and follow the package directions, and ask your provider if you have questions.  Do not drink alcohol.  Decongestants may help you feel better. You may use decongestant nose sprays Afrin  (oxymetazoline) or Sanket-Synephrine  (phenylephrine hydrochloride) for up to 3 days, or may use a decongestant tablet like Sudafed  (pseudoephedrine).  If you were given a prescription for medicine here today, be sure to read all of the information (including the package insert) that comes with your prescription.  This will include important information about the medicine, its side effects, and any warnings that you need to know about.  The pharmacist who fills the prescription can provide more information and answer questions you may have about the medicine.  If you have questions or concerns that the pharmacist cannot address, please call or return to the Emergency Department.   Remember that you can always come back to the Emergency Department if you are not able to see your regular provider in the amount of time listed above, if you get any new symptoms, or if there is anything that worries you.

## 2024-10-02 ENCOUNTER — HOSPITAL ENCOUNTER (EMERGENCY)
Facility: CLINIC | Age: 59
Discharge: HOME | End: 2024-10-02
Attending: EMERGENCY MEDICINE | Admitting: EMERGENCY MEDICINE

## 2024-10-02 VITALS
HEIGHT: 69 IN | OXYGEN SATURATION: 98 % | BODY MASS INDEX: 24.2 KG/M2 | RESPIRATION RATE: 18 BRPM | HEART RATE: 75 BPM | DIASTOLIC BLOOD PRESSURE: 69 MMHG | TEMPERATURE: 98 F | WEIGHT: 163.36 LBS | SYSTOLIC BLOOD PRESSURE: 114 MMHG

## 2024-10-02 DIAGNOSIS — R05.3 CHRONIC COUGH: ICD-10-CM

## 2024-10-02 PROCEDURE — 99284 EMERGENCY DEPT VISIT MOD MDM: CPT

## 2024-10-02 PROCEDURE — 87798 DETECT AGENT NOS DNA AMP: CPT | Performed by: EMERGENCY MEDICINE

## 2024-10-02 RX ORDER — AZITHROMYCIN 250 MG/1
TABLET, FILM COATED ORAL
Qty: 6 TABLET | Refills: 0 | Status: SHIPPED | OUTPATIENT
Start: 2024-10-02 | End: 2024-10-07

## 2024-10-02 RX ORDER — PREDNISONE 20 MG/1
40 TABLET ORAL DAILY
Qty: 10 TABLET | Refills: 0 | Status: SHIPPED | OUTPATIENT
Start: 2024-10-02 | End: 2024-10-07

## 2024-10-02 NOTE — ED TRIAGE NOTES
Pt arrives for cough and SOB, he states a few weeks ago started having a cough that has persistent. At  a few days ago, diagnosed with bronchitis, CXR done and given omnicef, tessalon and flonase-- no relief. Denies chest pain but reports chest wall pain from coughing. Came back from [place] at the end of July.

## 2024-10-02 NOTE — ED PROVIDER NOTES
"  Emergency Department Note      History of Present Illness     Chief Complaint   Cough      HPI   Lee Copeland is a 59 year old male with a history as noted below who presents to the ED today for evaluation of a cough. The patient reports he's had a chronic cough for the past 2-3 months that has caused it to become difficult to breathe. He reports that he is coughing up a green mucus with no blood. He mentions that he frequently gets chills and a subjective fever along with his coughing spouts. He doesn't have any regular medications, but he has been using an albuterol inhaler recently that he has for asthma, but this hasn't been helping. He went to urgent care five days ago where he was given antibiotics with no relief in symptoms. The patient denies any vomiting, chest pain, abdominal pain, or leg swelling. He used to smoke but doesn't anymore. He mentions that he doesn't currently have a primary care doctor due to his insurance.     Independent Historian   None    Review of External Notes   I reviewed the urgent care visit from 9/27/2024.  Patient was given cefdinir.  I also reviewed the previous ER visit from 7/31/2024 when patient was initially seen for acute cough and had an extensive workup including labs with D-dimer and chest x-ray.    Past Medical History     Medical History and Problem List   Allergic rhinitis  GERD  Hyperlipidemia     Medications   Albuterol  Cyclobenzaprine  Meclizine  Benzonatate   Cefdinir  Cetirizine   Doxycycline hyclate  Famotidine  Omeprazole  Simethicone     Surgical History   No past surgical history on file.     Physical Exam     Patient Vitals for the past 24 hrs:   BP Temp Temp src Pulse Resp SpO2 Height Weight   10/02/24 1753 -- -- -- -- 18 98 % -- --   10/02/24 1750 114/69 -- -- 75 -- -- -- --   10/02/24 1544 129/79 98  F (36.7  C) Temporal 91 20 100 % 1.753 m (5' 9\") 74.1 kg (163 lb 5.8 oz)     Physical Exam  Vitals and nursing note reviewed.   Constitutional:       " General: He is not in acute distress.     Appearance: He is not ill-appearing.   HENT:      Head: Normocephalic and atraumatic.      Right Ear: External ear normal.      Left Ear: External ear normal.      Nose: Nose normal.   Eyes:      Conjunctiva/sclera: Conjunctivae normal.   Cardiovascular:      Rate and Rhythm: Normal rate and regular rhythm.      Heart sounds: No murmur heard.  Pulmonary:      Effort: Pulmonary effort is normal. No respiratory distress.      Breath sounds: No wheezing, rhonchi or rales.   Abdominal:      General: Abdomen is flat. There is no distension.      Palpations: Abdomen is soft.      Tenderness: There is no abdominal tenderness. There is no guarding or rebound.   Musculoskeletal:         General: No swelling or deformity.      Cervical back: Normal range of motion and neck supple.   Skin:     General: Skin is warm and dry.      Findings: No rash.   Neurological:      Mental Status: He is alert and oriented to person, place, and time.   Psychiatric:         Behavior: Behavior normal.           Diagnostics     Lab Results   Labs Ordered and Resulted from Time of ED Arrival to Time of ED Departure - No data to display    Imaging   No orders to display     Independent Interpretation   None    ED Course      Medications Administered   Medications - No data to display    Discussion of Management   None    ED Course   ED Course as of 10/03/24 0130   Wed Oct 02, 2024   1733 I obtained history and examined the patient as noted above.        Additional Documentation  None    Medical Decision Making / Diagnosis     CMS Diagnoses: None    MIPS       None    MDM   Lee Copeland is a 59 year old male who presents with ongoing chronic cough for the past 2 months.  He has been evaluated once in the ER and twice in urgent care for the same symptoms.  He was recently started on cefdinir but has not noticed any improvement.  He had a chest x-ray in urgent care that showed no signs of any obvious  pneumonia.  He has no signs of CHF on his exam.  Have low suspicion for PE.  It is possible that the patient could have pertussis given the prolonged cough.  We will check a pertussis PCR and I will add azithromycin to his regimen.  He also is a former smoker so there could be some underlying reactive airway disease though I hear no wheezing on his exam.  I will add a short course of steroids to see if this helps with his cough.  I strongly recommended that he follow-up with primary care and we discussed return precautions.    Disposition   The patient was discharged.     Diagnosis     ICD-10-CM    1. Chronic cough  R05.3            Discharge Medications   Discharge Medication List as of 10/2/2024  5:49 PM        START taking these medications    Details   azithromycin (ZITHROMAX) 250 MG tablet Take 2 tablets (500 mg) by mouth daily for 1 day, THEN 1 tablet (250 mg) daily for 4 days., Disp-6 tablet, R-0, E-Prescribe      predniSONE (DELTASONE) 20 MG tablet Take 2 tablets (40 mg) by mouth daily for 5 days., Disp-10 tablet, R-0, E-Prescribe               Scribe Disclosure:  I, Meg Martini, am serving as a scribe at 5:43 PM on 10/2/2024 to document services personally performed by Marvin Stuart MD based on my observations and the provider's statements to me.        Marvin Stuart MD  10/03/24 0132

## 2024-10-03 LAB
B PARAPERT DNA SPEC QL NAA+PROBE: NOT DETECTED
B PERT DNA SPEC QL NAA+PROBE: NOT DETECTED

## 2025-04-19 ENCOUNTER — HEALTH MAINTENANCE LETTER (OUTPATIENT)
Age: 60
End: 2025-04-19